# Patient Record
Sex: MALE | Race: WHITE | NOT HISPANIC OR LATINO | URBAN - METROPOLITAN AREA
[De-identification: names, ages, dates, MRNs, and addresses within clinical notes are randomized per-mention and may not be internally consistent; named-entity substitution may affect disease eponyms.]

---

## 2018-08-31 ENCOUNTER — APPOINTMENT (OUTPATIENT)
Dept: GENERAL RADIOLOGY | Facility: HOSPITAL | Age: 48
End: 2018-08-31

## 2018-08-31 ENCOUNTER — HOSPITAL ENCOUNTER (OUTPATIENT)
Facility: HOSPITAL | Age: 48
Discharge: HOME | End: 2018-09-01
Attending: EMERGENCY MEDICINE | Admitting: EMERGENCY MEDICINE

## 2018-08-31 DIAGNOSIS — R26.2 DIFFICULTY WALKING: ICD-10-CM

## 2018-08-31 DIAGNOSIS — S82.51XA DISPLACED FRACTURE OF MEDIAL MALLEOLUS OF RIGHT TIBIA, INITIAL ENCOUNTER FOR CLOSED FRACTURE: Primary | ICD-10-CM

## 2018-08-31 DIAGNOSIS — S82.831A CLOSED FRACTURE OF PROXIMAL END OF RIGHT FIBULA, UNSPECIFIED FRACTURE MORPHOLOGY, INITIAL ENCOUNTER: ICD-10-CM

## 2018-08-31 PROBLEM — S82.861A: Status: ACTIVE | Noted: 2018-08-31

## 2018-08-31 PROBLEM — S93.422A SPRAIN OF DELTOID LIGAMENT OF LEFT ANKLE: Status: ACTIVE | Noted: 2018-08-31

## 2018-08-31 PROBLEM — S93.431A SYNDESMOTIC DISRUPTION OF RIGHT ANKLE: Status: ACTIVE | Noted: 2018-08-31

## 2018-08-31 PROBLEM — S82.62XD: Status: ACTIVE | Noted: 2018-08-31

## 2018-08-31 PROCEDURE — 25010000002 MORPHINE PER 10 MG: Performed by: EMERGENCY MEDICINE

## 2018-08-31 PROCEDURE — 73600 X-RAY EXAM OF ANKLE: CPT

## 2018-08-31 PROCEDURE — G0378 HOSPITAL OBSERVATION PER HR: HCPCS

## 2018-08-31 PROCEDURE — 99284 EMERGENCY DEPT VISIT MOD MDM: CPT

## 2018-08-31 PROCEDURE — 25010000002 ONDANSETRON PER 1 MG: Performed by: EMERGENCY MEDICINE

## 2018-08-31 PROCEDURE — 96374 THER/PROPH/DIAG INJ IV PUSH: CPT

## 2018-08-31 PROCEDURE — 96375 TX/PRO/DX INJ NEW DRUG ADDON: CPT

## 2018-08-31 PROCEDURE — 73590 X-RAY EXAM OF LOWER LEG: CPT

## 2018-08-31 RX ORDER — NALOXONE HCL 0.4 MG/ML
0.4 VIAL (ML) INJECTION
Status: DISCONTINUED | OUTPATIENT
Start: 2018-08-31 | End: 2018-09-01 | Stop reason: HOSPADM

## 2018-08-31 RX ORDER — ACETAMINOPHEN 500 MG
500 TABLET ORAL EVERY 6 HOURS PRN
COMMUNITY
End: 2018-09-01 | Stop reason: HOSPADM

## 2018-08-31 RX ORDER — ONDANSETRON 2 MG/ML
4 INJECTION INTRAMUSCULAR; INTRAVENOUS ONCE
Status: COMPLETED | OUTPATIENT
Start: 2018-08-31 | End: 2018-08-31

## 2018-08-31 RX ORDER — CEFAZOLIN SODIUM 2 G/100ML
2 INJECTION, SOLUTION INTRAVENOUS ONCE
Status: COMPLETED | OUTPATIENT
Start: 2018-08-31 | End: 2018-09-01

## 2018-08-31 RX ORDER — HYDROCODONE BITARTRATE AND ACETAMINOPHEN 7.5; 325 MG/1; MG/1
1 TABLET ORAL EVERY 4 HOURS PRN
Status: DISCONTINUED | OUTPATIENT
Start: 2018-08-31 | End: 2018-09-01 | Stop reason: HOSPADM

## 2018-08-31 RX ORDER — SODIUM CHLORIDE 0.9 % (FLUSH) 0.9 %
1-10 SYRINGE (ML) INJECTION AS NEEDED
Status: DISCONTINUED | OUTPATIENT
Start: 2018-08-31 | End: 2018-09-01 | Stop reason: HOSPADM

## 2018-08-31 RX ADMIN — HYDROCODONE BITARTRATE AND ACETAMINOPHEN 1 TABLET: 7.5; 325 TABLET ORAL at 15:23

## 2018-08-31 RX ADMIN — HYDROCODONE BITARTRATE AND ACETAMINOPHEN 1 TABLET: 7.5; 325 TABLET ORAL at 19:35

## 2018-08-31 RX ADMIN — MORPHINE SULFATE 4 MG: 4 INJECTION INTRAVENOUS at 10:49

## 2018-08-31 RX ADMIN — ONDANSETRON 4 MG: 2 INJECTION INTRAMUSCULAR; INTRAVENOUS at 10:49

## 2018-09-01 ENCOUNTER — ANESTHESIA (OUTPATIENT)
Dept: PERIOP | Facility: HOSPITAL | Age: 48
End: 2018-09-01

## 2018-09-01 ENCOUNTER — APPOINTMENT (OUTPATIENT)
Dept: GENERAL RADIOLOGY | Facility: HOSPITAL | Age: 48
End: 2018-09-01

## 2018-09-01 ENCOUNTER — ANESTHESIA EVENT (OUTPATIENT)
Dept: PERIOP | Facility: HOSPITAL | Age: 48
End: 2018-09-01

## 2018-09-01 VITALS
BODY MASS INDEX: 28.31 KG/M2 | TEMPERATURE: 97 F | OXYGEN SATURATION: 97 % | WEIGHT: 202.2 LBS | DIASTOLIC BLOOD PRESSURE: 88 MMHG | RESPIRATION RATE: 16 BRPM | SYSTOLIC BLOOD PRESSURE: 132 MMHG | HEART RATE: 78 BPM | HEIGHT: 71 IN

## 2018-09-01 LAB
ANION GAP SERPL CALCULATED.3IONS-SCNC: 8.7 MMOL/L
BASOPHILS # BLD AUTO: 0.03 10*3/MM3 (ref 0–0.2)
BASOPHILS NFR BLD AUTO: 0.4 % (ref 0–1.5)
BUN BLD-MCNC: 11 MG/DL (ref 6–20)
BUN/CREAT SERPL: 10.7 (ref 7–25)
CALCIUM SPEC-SCNC: 8.9 MG/DL (ref 8.6–10.5)
CHLORIDE SERPL-SCNC: 104 MMOL/L (ref 98–107)
CO2 SERPL-SCNC: 27.3 MMOL/L (ref 22–29)
CREAT BLD-MCNC: 1.03 MG/DL (ref 0.76–1.27)
DEPRECATED RDW RBC AUTO: 41 FL (ref 37–54)
EOSINOPHIL # BLD AUTO: 0.19 10*3/MM3 (ref 0–0.7)
EOSINOPHIL NFR BLD AUTO: 2.2 % (ref 0.3–6.2)
ERYTHROCYTE [DISTWIDTH] IN BLOOD BY AUTOMATED COUNT: 12.7 % (ref 11.5–14.5)
GFR SERPL CREATININE-BSD FRML MDRD: 77 ML/MIN/1.73
GLUCOSE BLD-MCNC: 106 MG/DL (ref 65–99)
HCT VFR BLD AUTO: 46.8 % (ref 40.4–52.2)
HGB BLD-MCNC: 15 G/DL (ref 13.7–17.6)
IMM GRANULOCYTES # BLD: 0.02 10*3/MM3 (ref 0–0.03)
IMM GRANULOCYTES NFR BLD: 0.2 % (ref 0–0.5)
LYMPHOCYTES # BLD AUTO: 2.17 10*3/MM3 (ref 0.9–4.8)
LYMPHOCYTES NFR BLD AUTO: 25.4 % (ref 19.6–45.3)
MCH RBC QN AUTO: 28.7 PG (ref 27–32.7)
MCHC RBC AUTO-ENTMCNC: 32.1 G/DL (ref 32.6–36.4)
MCV RBC AUTO: 89.7 FL (ref 79.8–96.2)
MONOCYTES # BLD AUTO: 0.7 10*3/MM3 (ref 0.2–1.2)
MONOCYTES NFR BLD AUTO: 8.2 % (ref 5–12)
NEUTROPHILS # BLD AUTO: 5.42 10*3/MM3 (ref 1.9–8.1)
NEUTROPHILS NFR BLD AUTO: 63.6 % (ref 42.7–76)
PLATELET # BLD AUTO: 251 10*3/MM3 (ref 140–500)
PMV BLD AUTO: 10.3 FL (ref 6–12)
POTASSIUM BLD-SCNC: 5 MMOL/L (ref 3.5–5.2)
RBC # BLD AUTO: 5.22 10*6/MM3 (ref 4.6–6)
SODIUM BLD-SCNC: 140 MMOL/L (ref 136–145)
WBC NRBC COR # BLD: 8.53 10*3/MM3 (ref 4.5–10.7)

## 2018-09-01 PROCEDURE — 25010000003 CEFAZOLIN IN DEXTROSE 2-4 GM/100ML-% SOLUTION: Performed by: ORTHOPAEDIC SURGERY

## 2018-09-01 PROCEDURE — 80048 BASIC METABOLIC PNL TOTAL CA: CPT | Performed by: ORTHOPAEDIC SURGERY

## 2018-09-01 PROCEDURE — C1769 GUIDE WIRE: HCPCS | Performed by: ORTHOPAEDIC SURGERY

## 2018-09-01 PROCEDURE — 25010000002 PROPOFOL 10 MG/ML EMULSION: Performed by: NURSE ANESTHETIST, CERTIFIED REGISTERED

## 2018-09-01 PROCEDURE — 85025 COMPLETE CBC W/AUTO DIFF WBC: CPT | Performed by: ORTHOPAEDIC SURGERY

## 2018-09-01 PROCEDURE — 25010000002 FENTANYL CITRATE (PF) 100 MCG/2ML SOLUTION: Performed by: NURSE ANESTHETIST, CERTIFIED REGISTERED

## 2018-09-01 PROCEDURE — 25010000002 ROPIVACAINE PER 1 MG: Performed by: ANESTHESIOLOGY

## 2018-09-01 PROCEDURE — 25010000002 FENTANYL CITRATE (PF) 100 MCG/2ML SOLUTION: Performed by: ANESTHESIOLOGY

## 2018-09-01 PROCEDURE — 96376 TX/PRO/DX INJ SAME DRUG ADON: CPT

## 2018-09-01 PROCEDURE — 25010000002 ONDANSETRON PER 1 MG: Performed by: NURSE ANESTHETIST, CERTIFIED REGISTERED

## 2018-09-01 PROCEDURE — 25010000002 HYDROMORPHONE PER 4 MG: Performed by: ORTHOPAEDIC SURGERY

## 2018-09-01 PROCEDURE — 76000 FLUOROSCOPY <1 HR PHYS/QHP: CPT

## 2018-09-01 PROCEDURE — C1713 ANCHOR/SCREW BN/BN,TIS/BN: HCPCS | Performed by: ORTHOPAEDIC SURGERY

## 2018-09-01 PROCEDURE — 25010000002 KETOROLAC TROMETHAMINE PER 15 MG: Performed by: NURSE ANESTHETIST, CERTIFIED REGISTERED

## 2018-09-01 PROCEDURE — 25010000002 DEXAMETHASONE PER 1 MG: Performed by: NURSE ANESTHETIST, CERTIFIED REGISTERED

## 2018-09-01 PROCEDURE — 73610 X-RAY EXAM OF ANKLE: CPT

## 2018-09-01 PROCEDURE — 97161 PT EVAL LOW COMPLEX 20 MIN: CPT | Performed by: PHYSICAL THERAPIST

## 2018-09-01 PROCEDURE — G0378 HOSPITAL OBSERVATION PER HR: HCPCS

## 2018-09-01 PROCEDURE — 25010000002 MIDAZOLAM PER 1 MG: Performed by: ANESTHESIOLOGY

## 2018-09-01 DEVICE — BONE SCREW
Type: IMPLANTABLE DEVICE | Site: ANKLE | Status: FUNCTIONAL
Brand: VARIAX

## 2018-09-01 DEVICE — CANNULATED SCREW
Type: IMPLANTABLE DEVICE | Site: ANKLE | Status: FUNCTIONAL
Brand: ASNIS

## 2018-09-01 RX ORDER — OXYCODONE AND ACETAMINOPHEN 7.5; 325 MG/1; MG/1
1 TABLET ORAL ONCE AS NEEDED
Status: DISCONTINUED | OUTPATIENT
Start: 2018-09-01 | End: 2018-09-01 | Stop reason: HOSPADM

## 2018-09-01 RX ORDER — PROMETHAZINE HYDROCHLORIDE 25 MG/ML
12.5 INJECTION, SOLUTION INTRAMUSCULAR; INTRAVENOUS ONCE AS NEEDED
Status: DISCONTINUED | OUTPATIENT
Start: 2018-09-01 | End: 2018-09-01 | Stop reason: HOSPADM

## 2018-09-01 RX ORDER — EPHEDRINE SULFATE 50 MG/ML
5 INJECTION, SOLUTION INTRAVENOUS ONCE AS NEEDED
Status: DISCONTINUED | OUTPATIENT
Start: 2018-09-01 | End: 2018-09-01 | Stop reason: HOSPADM

## 2018-09-01 RX ORDER — PROMETHAZINE HYDROCHLORIDE 25 MG/1
12.5 TABLET ORAL ONCE AS NEEDED
Status: DISCONTINUED | OUTPATIENT
Start: 2018-09-01 | End: 2018-09-01 | Stop reason: HOSPADM

## 2018-09-01 RX ORDER — PROMETHAZINE HYDROCHLORIDE 25 MG/1
25 SUPPOSITORY RECTAL ONCE AS NEEDED
Status: DISCONTINUED | OUTPATIENT
Start: 2018-09-01 | End: 2018-09-01 | Stop reason: HOSPADM

## 2018-09-01 RX ORDER — FAMOTIDINE 10 MG/ML
20 INJECTION, SOLUTION INTRAVENOUS ONCE
Status: COMPLETED | OUTPATIENT
Start: 2018-09-01 | End: 2018-09-01

## 2018-09-01 RX ORDER — ONDANSETRON 2 MG/ML
4 INJECTION INTRAMUSCULAR; INTRAVENOUS ONCE AS NEEDED
Status: DISCONTINUED | OUTPATIENT
Start: 2018-09-01 | End: 2018-09-01 | Stop reason: HOSPADM

## 2018-09-01 RX ORDER — DEXAMETHASONE SODIUM PHOSPHATE 10 MG/ML
INJECTION INTRAMUSCULAR; INTRAVENOUS AS NEEDED
Status: DISCONTINUED | OUTPATIENT
Start: 2018-09-01 | End: 2018-09-01 | Stop reason: SURG

## 2018-09-01 RX ORDER — NAPROXEN 500 MG/1
500 TABLET ORAL 2 TIMES DAILY
Qty: 60 TABLET | Refills: 0 | Status: SHIPPED | OUTPATIENT
Start: 2018-09-01

## 2018-09-01 RX ORDER — SODIUM CHLORIDE, SODIUM LACTATE, POTASSIUM CHLORIDE, CALCIUM CHLORIDE 600; 310; 30; 20 MG/100ML; MG/100ML; MG/100ML; MG/100ML
9 INJECTION, SOLUTION INTRAVENOUS CONTINUOUS
Status: DISCONTINUED | OUTPATIENT
Start: 2018-09-01 | End: 2018-09-01 | Stop reason: HOSPADM

## 2018-09-01 RX ORDER — FENTANYL CITRATE 50 UG/ML
50 INJECTION, SOLUTION INTRAMUSCULAR; INTRAVENOUS
Status: DISCONTINUED | OUTPATIENT
Start: 2018-09-01 | End: 2018-09-01 | Stop reason: HOSPADM

## 2018-09-01 RX ORDER — FENTANYL CITRATE 50 UG/ML
INJECTION, SOLUTION INTRAMUSCULAR; INTRAVENOUS
Status: COMPLETED
Start: 2018-09-01 | End: 2018-09-01

## 2018-09-01 RX ORDER — CEFAZOLIN SODIUM 2 G/100ML
2 INJECTION, SOLUTION INTRAVENOUS EVERY 8 HOURS
Status: COMPLETED | OUTPATIENT
Start: 2018-09-01 | End: 2018-09-01

## 2018-09-01 RX ORDER — DIPHENHYDRAMINE HYDROCHLORIDE 50 MG/ML
12.5 INJECTION INTRAMUSCULAR; INTRAVENOUS
Status: DISCONTINUED | OUTPATIENT
Start: 2018-09-01 | End: 2018-09-01 | Stop reason: HOSPADM

## 2018-09-01 RX ORDER — MAGNESIUM HYDROXIDE 1200 MG/15ML
LIQUID ORAL AS NEEDED
Status: DISCONTINUED | OUTPATIENT
Start: 2018-09-01 | End: 2018-09-01 | Stop reason: HOSPADM

## 2018-09-01 RX ORDER — FLUMAZENIL 0.1 MG/ML
0.2 INJECTION INTRAVENOUS AS NEEDED
Status: DISCONTINUED | OUTPATIENT
Start: 2018-09-01 | End: 2018-09-01 | Stop reason: HOSPADM

## 2018-09-01 RX ORDER — HYDROCODONE BITARTRATE AND ACETAMINOPHEN 7.5; 325 MG/1; MG/1
1 TABLET ORAL EVERY 4 HOURS PRN
Qty: 60 TABLET | Refills: 0 | Status: SHIPPED | OUTPATIENT
Start: 2018-09-01 | End: 2018-09-01

## 2018-09-01 RX ORDER — SODIUM CHLORIDE 0.9 % (FLUSH) 0.9 %
1-10 SYRINGE (ML) INJECTION AS NEEDED
Status: DISCONTINUED | OUTPATIENT
Start: 2018-09-01 | End: 2018-09-01 | Stop reason: HOSPADM

## 2018-09-01 RX ORDER — PROPOFOL 10 MG/ML
VIAL (ML) INTRAVENOUS AS NEEDED
Status: DISCONTINUED | OUTPATIENT
Start: 2018-09-01 | End: 2018-09-01 | Stop reason: SURG

## 2018-09-01 RX ORDER — ONDANSETRON 2 MG/ML
INJECTION INTRAMUSCULAR; INTRAVENOUS AS NEEDED
Status: DISCONTINUED | OUTPATIENT
Start: 2018-09-01 | End: 2018-09-01 | Stop reason: SURG

## 2018-09-01 RX ORDER — LIDOCAINE HYDROCHLORIDE 10 MG/ML
0.5 INJECTION, SOLUTION EPIDURAL; INFILTRATION; INTRACAUDAL; PERINEURAL ONCE AS NEEDED
Status: DISCONTINUED | OUTPATIENT
Start: 2018-09-01 | End: 2018-09-01 | Stop reason: HOSPADM

## 2018-09-01 RX ORDER — ROPIVACAINE HYDROCHLORIDE 5 MG/ML
INJECTION, SOLUTION EPIDURAL; INFILTRATION; PERINEURAL AS NEEDED
Status: DISCONTINUED | OUTPATIENT
Start: 2018-09-01 | End: 2018-09-01 | Stop reason: SURG

## 2018-09-01 RX ORDER — LABETALOL HYDROCHLORIDE 5 MG/ML
5 INJECTION, SOLUTION INTRAVENOUS
Status: DISCONTINUED | OUTPATIENT
Start: 2018-09-01 | End: 2018-09-01 | Stop reason: HOSPADM

## 2018-09-01 RX ORDER — PROMETHAZINE HYDROCHLORIDE 25 MG/1
25 TABLET ORAL ONCE AS NEEDED
Status: DISCONTINUED | OUTPATIENT
Start: 2018-09-01 | End: 2018-09-01 | Stop reason: HOSPADM

## 2018-09-01 RX ORDER — HYDROCODONE BITARTRATE AND ACETAMINOPHEN 7.5; 325 MG/1; MG/1
1 TABLET ORAL ONCE AS NEEDED
Status: DISCONTINUED | OUTPATIENT
Start: 2018-09-01 | End: 2018-09-01 | Stop reason: HOSPADM

## 2018-09-01 RX ORDER — MIDAZOLAM HYDROCHLORIDE 1 MG/ML
1 INJECTION INTRAMUSCULAR; INTRAVENOUS
Status: DISCONTINUED | OUTPATIENT
Start: 2018-09-01 | End: 2018-09-01 | Stop reason: HOSPADM

## 2018-09-01 RX ORDER — KETOROLAC TROMETHAMINE 30 MG/ML
INJECTION, SOLUTION INTRAMUSCULAR; INTRAVENOUS AS NEEDED
Status: DISCONTINUED | OUTPATIENT
Start: 2018-09-01 | End: 2018-09-01 | Stop reason: SURG

## 2018-09-01 RX ORDER — MIDAZOLAM HYDROCHLORIDE 1 MG/ML
2 INJECTION INTRAMUSCULAR; INTRAVENOUS
Status: DISCONTINUED | OUTPATIENT
Start: 2018-09-01 | End: 2018-09-01 | Stop reason: HOSPADM

## 2018-09-01 RX ORDER — HYDROCODONE BITARTRATE AND ACETAMINOPHEN 7.5; 325 MG/1; MG/1
1 TABLET ORAL EVERY 4 HOURS PRN
Qty: 60 TABLET | Refills: 0 | Status: SHIPPED | OUTPATIENT
Start: 2018-09-01 | End: 2018-09-13

## 2018-09-01 RX ORDER — LIDOCAINE HYDROCHLORIDE 20 MG/ML
INJECTION, SOLUTION INFILTRATION; PERINEURAL AS NEEDED
Status: DISCONTINUED | OUTPATIENT
Start: 2018-09-01 | End: 2018-09-01 | Stop reason: SURG

## 2018-09-01 RX ADMIN — CEFAZOLIN SODIUM 2 G: 2 INJECTION, SOLUTION INTRAVENOUS at 16:32

## 2018-09-01 RX ADMIN — MIDAZOLAM HYDROCHLORIDE 2 MG: 2 INJECTION, SOLUTION INTRAMUSCULAR; INTRAVENOUS at 07:10

## 2018-09-01 RX ADMIN — FAMOTIDINE 20 MG: 10 INJECTION, SOLUTION INTRAVENOUS at 07:12

## 2018-09-01 RX ADMIN — PROPOFOL 200 MG: 10 INJECTION, EMULSION INTRAVENOUS at 07:58

## 2018-09-01 RX ADMIN — ONDANSETRON 4 MG: 2 INJECTION INTRAMUSCULAR; INTRAVENOUS at 08:55

## 2018-09-01 RX ADMIN — FENTANYL CITRATE 25 MCG: 50 INJECTION INTRAMUSCULAR; INTRAVENOUS at 08:30

## 2018-09-01 RX ADMIN — FENTANYL CITRATE 50 MCG: 50 INJECTION INTRAMUSCULAR; INTRAVENOUS at 10:45

## 2018-09-01 RX ADMIN — HYDROMORPHONE HYDROCHLORIDE 0.5 MG: 1 INJECTION, SOLUTION INTRAMUSCULAR; INTRAVENOUS; SUBCUTANEOUS at 05:36

## 2018-09-01 RX ADMIN — KETOROLAC TROMETHAMINE 30 MG: 30 INJECTION, SOLUTION INTRAMUSCULAR; INTRAVENOUS at 08:55

## 2018-09-01 RX ADMIN — LIDOCAINE HYDROCHLORIDE 60 MG: 20 INJECTION, SOLUTION INFILTRATION; PERINEURAL at 07:58

## 2018-09-01 RX ADMIN — FENTANYL CITRATE 75 MCG: 50 INJECTION INTRAMUSCULAR; INTRAVENOUS at 10:05

## 2018-09-01 RX ADMIN — HYDROCODONE BITARTRATE AND ACETAMINOPHEN 1 TABLET: 7.5; 325 TABLET ORAL at 17:27

## 2018-09-01 RX ADMIN — HYDROMORPHONE HYDROCHLORIDE 0.5 MG: 1 INJECTION, SOLUTION INTRAMUSCULAR; INTRAVENOUS; SUBCUTANEOUS at 02:50

## 2018-09-01 RX ADMIN — SODIUM CHLORIDE, POTASSIUM CHLORIDE, SODIUM LACTATE AND CALCIUM CHLORIDE 9 ML/HR: 600; 310; 30; 20 INJECTION, SOLUTION INTRAVENOUS at 07:10

## 2018-09-01 RX ADMIN — ROPIVACAINE HYDROCHLORIDE 30 ML: 5 INJECTION, SOLUTION EPIDURAL; INFILTRATION; PERINEURAL at 07:26

## 2018-09-01 RX ADMIN — CEFAZOLIN SODIUM 2 G: 2 INJECTION, SOLUTION INTRAVENOUS at 08:04

## 2018-09-01 RX ADMIN — HYDROCODONE BITARTRATE AND ACETAMINOPHEN 1 TABLET: 7.5; 325 TABLET ORAL at 13:08

## 2018-09-01 RX ADMIN — HYDROMORPHONE HYDROCHLORIDE 0.5 MG: 1 INJECTION, SOLUTION INTRAMUSCULAR; INTRAVENOUS; SUBCUTANEOUS at 00:53

## 2018-09-01 RX ADMIN — FENTANYL CITRATE 50 MCG: 50 INJECTION INTRAMUSCULAR; INTRAVENOUS at 10:35

## 2018-09-01 RX ADMIN — FENTANYL CITRATE 100 MCG: 50 INJECTION INTRAMUSCULAR; INTRAVENOUS at 07:10

## 2018-09-01 RX ADMIN — DEXAMETHASONE SODIUM PHOSPHATE 8 MG: 10 INJECTION INTRAMUSCULAR; INTRAVENOUS at 08:55

## 2018-09-01 NOTE — OP NOTE
Operative  Note    Patient: Thom Ramirez    YOB: 1970    Medical Record Number: [id number]    Attending Physician: Celestina Meng,*    Primary Care Physician: Provider, No Known    Surgeon: Celestina Meng MD    Date of Service: 9/1/2018     Pre-op Diagnosis:   1. Right  ankle medial malleolar fracture 2. Right ankle high fibular fracture, Maisonnauve injury  3. Syndesmotic disruption right ankle  4. Left ankle lateral malleolus tip avulsion fracture, Sprain left ankle    Post-Op Diagnosis Codes:  1. Right  ankle bimalleolar fracture 2. Right ankle high fibular fracture, Maisonnauve injury  3. Syndesmotic disruption right ankle  4. Left ankle lateral malleolus tip avulsion fracture, Sprain left ankle    PROCEDURE PERFORMED:     1. ORIF RIGHT ANKLE MEDIAL MALLEOLAR FRACTURE   2. OPEN REDUCTION AND INTERNAL FIXATION OF RIGHT ANKLE SYNDESMOSIS  3. EXAMINATION LEFT ANKLE UNDER ANESTHESIA  4.NONOPERATIVE MANAGEMENT LEFT ANKLE FIBULA AVULSION FRACTURE    RIGHT  ANKLE OPEN REDUCTION INTERNAL FIXATION RT ANKLE FRACTURE   ankle  fracture  utilizing a  Cortical screws and 4.0 cannulated cancellous screws ([place]).     IMPLANTS  3.5 mm Cortical screw - 2  4.0 mm Cannulated cancellous screw - 2    ANESTHESIA: General  Anesthesiologist: Debo Hills MD  CRNA: Salud Patterson CRNA       Estimated Blood Loss: minimal    Specimens: * No orders in the log *     COMPLICATIONS: Nil.     DRAINS:     SURGEON: Celestina Meng M.D.    ASSISTANTS:  None.        INDICATIONS: The patient is a 47 y.o. male  who presented to [facility] following a history of fall from his roof  with a history of injury and ankle fracture.  The patient sustained bilateral ankle injuries.  He was evaluated in the emergency room and placed into a splint for the RIGHT ankle.  He was given a boot for the LEFT ankle. The patient was evaluated and scheduled for surgery . Options were discussed. The  patient was indicated for an open reduction and internal fixation of RIGHT ankle fracture and examination under anesthesia of the LEFT ankle.  The patient elected to proceed with an open reduction internal fixation. Likely risks and benefits of the procedure including, but not limited to infection, malunion, nonunion, posttraumatic stiffness, posttraumatic arthritis, possibility of injury to nerves, vessels or tendons have been discussed in detail. Despite the risks involved, the patient elected to proceed and informed consent was obtained and was scheduled for surgery. The patient was seen in the preoperative holding area and the operative site was marked.    PROCEDURE: The patient has been transferred to [facility] operating room. Preoperative antibiotic Kefzol  Intravenously  were given prior to the placement of tourniquet . After achieving adequate anesthesia, a well-padded tourniquet was placed over the proximal aspect of the operative thigh. The leg was prepped and draped in the usual sterile fashion. Surgical timeout was done. Correct patient surgical side and site were identified. Tourniquet was elevated to a pressure of 250 mmHg.     A skin incision was made on the medial malleolus centering over the fracture site. The skin and subcutaneous tissue were incised and the fracture site was identified. There was hematoma this was cleared.  The medial malleolus fracture fragment was inspected.  This was a small fragment and was obliquely oriented. The medial malleolus fracture was reduced and temporarily fixed with two threaded K wires. I had to place the K wires, aiming towards the posterior cortex to get the best fixation. The reduction of the medial malleolus was found to be satisfactory. The position of K wires for the 4.0 cancellous screws was found to be satisfactory. The ankle mortise was found to be congruous. The cannulated reamer was utilized and partially-threaded 4.0 cannulated  cancellous screws were seated over the guidewires.  The reduction of the fracture,  Fixation of the fracture and position of the hardware was found to be satisfactory and stable.      The posterior malleolus fragment was very small and satisfactorily reduced in view of this, no internal fixation is being used for the posterior malleolar fragment.    The ankle mortise was stressed with external rotation there was   widening of the syndesmosis. The patient had a significant syndesmotic injury.  There was a small fracture fragment of the tip of the lateral malleolus. This fragment was small and was not displaced.  It was intra-articular.  The major factor in the fibula was the fibular neck fracture.  This was a Maisonnauve injury.  It was planned to proceed with open reduction of the syndesmosis and internal fixation. His small skin incision was made over the lateral aspect of the lateral malleolus about 2 cm.  Skin and subcutaneous tissue were incised and the lateral fibular margin was identified.  Soft tissue dissection was done anteriorly to palpate the syndesmosis.  This was found to be widened.  This was reduced by utilizing a pointed reduction forceps/ Lees clamp. The reduction was found to be satisfactory with restoration of the fibular length and satisfactory overlap of the fibula syndesmotic reduction.  3.5 mm cortical screws were placed as positional screws from lateral cortex of the fibula transversely parallel to the articular surface about 2 cm proximal to the tibial articular surface. The reduction of the syndesmosis and the position of the hardware was found to be satisfactory.  After removing the clamp.  The ankle was stressed and it was found to be stable.    The sponge count and needle count was found to be correct. The incisions were thoroughly irrigated and were closed in layers.  Sterile dressings were placed and the patient was transferred to the recovery room in a stable condition with a  well-padded posterior splint.      The LEFT ankle was removed from the boot and examined.  The ankle mortise was stable for stress views.  The suspicion of medial clear wide joint space was not there with proper x-rays were obtained. We will continue the cam boot for the LEFT ankle. The patient will be allowed weightbearing as tolerated on the LEFT ankle.    The patient tolerated the procedure well.  There were no complications.  The patient  is being transferred to the floor and then discharged home with instructions to keep the operated lower extremity elevated above heart level.  The patient  will remain nonweightbearing on the operated RIGHT lower extremity.      The patient will follow up with me in the office in 10-12 days  and will call [phone] for appointment.      Norco 7.5/325 mg 1 by mouth every 4 hours when necessary pain, prescription has been given.    The patient will notify me immediately if they notice any temperature greater than 101°F or increased pain or any drainage from her incision in the splint.    I discussed the satisfactory performance of the procedure with the patient's family and discussed with them The postoperative management.     9/1/2018  10:06 AM  Celestina Meng MD    CC: Provider, No Known; MD Yusra Ayala, Celestina MARVIN,*

## 2018-09-01 NOTE — PLAN OF CARE
Problem: Patient Care Overview  Goal: Plan of Care Review  Outcome: Ongoing (interventions implemented as appropriate)   09/01/18 8082   Coping/Psychosocial   Plan of Care Reviewed With patient   Plan of Care Review   Progress improving   OTHER   Outcome Summary Patient suffered fall and B ankle fractures, is NWB on R LE and left is WBAT in walking boot. Patient able to ambulate short distances with RW and maintain NWB status and is independent with mobility. D/C PT, no concerns for patient to return home

## 2018-09-01 NOTE — ANESTHESIA PROCEDURE NOTES
Adductor canal block    Patient location during procedure: holding area  Start time: 9/1/2018 7:24 AM  Stop time: 9/1/2018 7:26 AM  Reason for block: at surgeon's request and post-op pain management  Performed by  Anesthesiologist: NILO CUNNINGHAM  Preanesthetic Checklist  Completed: patient identified, site marked, surgical consent, pre-op evaluation, timeout performed, IV checked, risks and benefits discussed and monitors and equipment checked  Prep:  Sterile barriers:cap, gloves and mask  Prep: ChloraPrep  Patient monitoring: blood pressure monitoring, continuous pulse oximetry and EKG  Procedure  Sedation:yes  Performed under: local infiltration  Guidance:ultrasound guided  ULTRASOUND INTERPRETATION. Using ultrasound guidance a 21 G gauge needle was placed in close proximity to the nerve, at which point, under ultrasound guidance anesthetic was injected in the area of the nerve and spread of the anesthesia was seen on ultrasound in close proximity thereto.  There were no abnormalities seen on ultrasound; a digital image was taken; and the patient tolerated the procedure with no complications. Images:still images obtained    Laterality:right  Block Type:adductor canal block  Injection Technique:single-shot  Needle Type:echogenic  Needle Gauge:21 G    Medications  Local Injected:ropivacaine 0.5% Local Amount Injected:10mL  Post Assessment  Injection Assessment: negative aspiration for heme, no paresthesia on injection and incremental injection  Patient Tolerance:comfortable throughout block  Complications:no

## 2018-09-01 NOTE — PLAN OF CARE
Problem: Patient Care Overview  Goal: Plan of Care Review  Outcome: Ongoing (interventions implemented as appropriate)   09/01/18 1501   Coping/Psychosocial   Plan of Care Reviewed With patient   Plan of Care Review   Progress improving   OTHER   Outcome Summary Bilateral ankle fx. ORIF of right done in or today. Left in walking boot. Blocks working from surgery. UP with PT. Expecting discharge tonight or am       Problem: Fall Risk (Adult)  Goal: Identify Related Risk Factors and Signs and Symptoms  Outcome: Outcome(s) achieved Date Met: 09/01/18    Goal: Absence of Fall  Outcome: Ongoing (interventions implemented as appropriate)

## 2018-09-01 NOTE — PLAN OF CARE
Problem: Patient Care Overview  Goal: Plan of Care Review  Outcome: Ongoing (interventions implemented as appropriate)   09/01/18 0300   Coping/Psychosocial   Plan of Care Reviewed With patient   Plan of Care Review   Progress improving   OTHER   Outcome Summary Pt pain controlled with PO and IV pain meds. VSS. Pt NWB to RLE and WBAT to LLE. Pt voiding per urinal. Splint on RLE and boot on LLE. Pt to go to surgery for ORIF of RLE. NPO. Will continue to monitor.      Goal: Individualization and Mutuality  Outcome: Ongoing (interventions implemented as appropriate)    Goal: Discharge Needs Assessment  Outcome: Ongoing (interventions implemented as appropriate)    Goal: Interprofessional Rounds/Family Conf  Outcome: Ongoing (interventions implemented as appropriate)      Problem: Fall Risk (Adult)  Goal: Identify Related Risk Factors and Signs and Symptoms  Outcome: Ongoing (interventions implemented as appropriate)    Goal: Absence of Fall  Outcome: Ongoing (interventions implemented as appropriate)      Problem: Pain, Acute (Adult)  Goal: Identify Related Risk Factors and Signs and Symptoms  Outcome: Ongoing (interventions implemented as appropriate)    Goal: Acceptable Pain Control/Comfort Level  Outcome: Ongoing (interventions implemented as appropriate)

## 2018-09-01 NOTE — ANESTHESIA PROCEDURE NOTES
Popliteal block    Start time: 9/1/2018 7:18 AM  Stop time: 9/1/2018 7:24 AM  Reason for block: at surgeon's request and post-op pain management  Performed by  Anesthesiologist: NILO CUNNINGHAM  Preanesthetic Checklist  Completed: patient identified, site marked, surgical consent, pre-op evaluation, timeout performed, IV checked, risks and benefits discussed and monitors and equipment checked  Prep:  Pt Position: supine  Sterile barriers:cap, gloves and mask  Prep: ChloraPrep  Patient monitoring: blood pressure monitoring, continuous pulse oximetry and EKG  Procedure  Sedation:yes  Performed under: local infiltration  Guidance:ultrasound guided  ULTRASOUND INTERPRETATION.  Using ultrasound guidance a 21 G gauge needle was placed in close proximity to the sciatic nerve, at which point, under ultrasound guidance anesthetic was injected in the area of the nerve and spread of the anesthesia was seen on ultrasound in close proximity thereto.  There were no abnormalities seen on ultrasound; a digital image was taken; and the patient tolerated the procedure with no complications. Images:still images obtained    Laterality:right  Block Type:popliteal  Injection Technique:single-shot  Needle Type:echogenic  Needle Gauge:21 G    Medications  Local Injected:ropivacaine 0.5% Local Amount Injected:20mL  Post Assessment  Injection Assessment: negative aspiration for heme, no paresthesia on injection and incremental injection  Patient Tolerance:comfortable throughout block  Complications:no

## 2018-09-01 NOTE — ANESTHESIA POSTPROCEDURE EVALUATION
Patient: Thom Ramirez    Procedure Summary     Date:  09/01/18 Room / Location:  [facility] OR [address] [place] [address] MAIN OR    Anesthesia Start:  0745 Anesthesia Stop:  1005    Procedure:  ORIF RIGHT ANKLE, EXAMINATION LEFT ANKLE (Right Ankle) Diagnosis:      Surgeon:  Celestina Meng MD Provider:  Debo Hills MD    Anesthesia Type:  general ASA Status:  1          Anesthesia Type: general  Last vitals  BP   124/94 (09/01/18 1015)   Temp   36.5 °C (97.7 °F) (09/01/18 1000)   Pulse   72 (09/01/18 1015)   Resp   16 (09/01/18 1015)     SpO2   96 % (09/01/18 1015)     Post Anesthesia Care and Evaluation    Patient location during evaluation: bedside  Patient participation: complete - patient participated  Level of consciousness: awake and alert  Pain management: adequate  Airway patency: patent  Anesthetic complications: No anesthetic complications  PONV Status: controlled  Cardiovascular status: acceptable  Respiratory status: acceptable  Hydration status: acceptable

## 2018-09-01 NOTE — BRIEF OP NOTE
ANKLE OPEN REDUCTION INTERNAL FIXATION  Progress Note    Thom Ramirez  8/31/2018 - 9/1/2018    Pre-op Diagnosis:   1. Right  ankle medial malleolar fracture 2. Right ankle high fibular fracture, Maisonnauve injury  3. Syndesmotic disruption right ankle  4. Left ankle lateral malleolus tip avulsion fracture, Sprain left ankle       Post-Op Diagnosis Codes:  1. Right  ankle medial malleolar fracture 2. Right ankle high fibular fracture, Maisonnauve injury  3. Syndesmotic disruption right ankle  4. Left ankle lateral malleolus tip avulsion fracture, Sprain left ankle    Procedure/CPT® Codes:      Procedure(s):  1. ORIF RIGHT ANKLE MEDIAL MALLEOLAR FRACTURE   2. OPEN REDUCTION AND INTERNAL FIXATION OF RIGHT ANKLE SYNDESMOSIS  3. EXAMINATION LEFT ANKLE    Surgeon(s):  Celestina Meng MD    Anesthesia: General    Staff:   Circulator: Kimberly Mcnulty RN  Radiology Technologist: Barbara Faustin RRT  Scrub Person: Niurka Calles  Vendor Representative: Samir Daniels    Estimated Blood Loss: 10 ML    Urine Voided: * No values recorded between 9/1/2018  7:53 AM and 9/1/2018 10:00 AM *    Specimens:                None      Drains:      Findings: SEE DICT     Complications: YOU Meng MD     Date: 9/1/2018  Time: 10:04 AM

## 2018-09-01 NOTE — DISCHARGE SUMMARY
DISCHARGE SUMMARY    Date of Discharge:      Discharge Diagnosis:  Closed fracture of proximal end of right fibula, unspecified fracture morphology, initial encounter [S82.831A]  Displaced fracture of medial malleolus of right tibia, initial encounter for closed fracture [S82.51XA]     1. Right  ankle medial malleolar fracture 2. Right ankle high fibular fracture, Maisonnauve injury  3. Syndesmotic disruption right ankle  4. Left ankle lateral malleolus tip avulsion fracture, Sprain left ankle    DETAILS OF HOSPITAL STAY     Presenting Problem: Closed fracture of proximal end of right fibula, unspecified fracture morphology, initial encounter [S82.831A]  Displaced fracture of medial malleolus of right tibia, initial encounter for closed fracture [S82.51XA]    Hospital Course/History of Present Illness: Patient is a 47 y.o. male presented with Closed fracture of proximal end of right fibula, unspecified fracture morphology, initial encounter [S82.831A]  Displaced fracture of medial malleolus of right tibia, initial encounter for closed fracture [S82.51XA].     The patient fell from a height from a ladder.  He injured both ankles.  He underwent open reduction internal fixation of the RIGHT medial malleolus fracture and syndesmotic fixation.  He was also examined under anesthesia for the LEFT ankle fracture.  The patient did well postoperatively. At the time of discharge, the patient was afebrile, tolerating a regular diet, having normal bowel function, and adequate pain control on p.o. pain medication only. He is able to mobilize weightbearing as tolerated on the LEFT ankle With the [place].      Procedures Performed  Procedure(s):  ORIF RIGHT ANKLE, EXAMINATION LEFT ANKLE       Consults:   Consults     Date and Time Order Name Status Description    8/31/2018 1118 Ortho (on-call MD unless specified) Completed           Pertinent Test Results:     Admission on 08/31/2018   Component Date Value Ref Range Status    • Glucose 09/01/2018 106* 65 - 99 mg/dL Final   • BUN 09/01/2018 11  6 - 20 mg/dL Final   • Creatinine 09/01/2018 1.03  0.76 - 1.27 mg/dL Final   • Sodium 09/01/2018 140  136 - 145 mmol/L Final   • Potassium 09/01/2018 5.0  3.5 - 5.2 mmol/L Final   • Chloride 09/01/2018 104  98 - 107 mmol/L Final   • CO2 09/01/2018 27.3  22.0 - 29.0 mmol/L Final   • Calcium 09/01/2018 8.9  8.6 - 10.5 mg/dL Final   • eGFR Non African Amer 09/01/2018 77  >60 mL/min/1.73 Final   • BUN/Creatinine Ratio 09/01/2018 10.7  7.0 - 25.0 Final   • Anion Gap 09/01/2018 8.7  mmol/L Final   • WBC 09/01/2018 8.53  4.50 - 10.70 10*3/mm3 Final   • RBC 09/01/2018 5.22  4.60 - 6.00 10*6/mm3 Final   • Hemoglobin 09/01/2018 15.0  13.7 - 17.6 g/dL Final   • Hematocrit 09/01/2018 46.8  40.4 - 52.2 % Final   • MCV 09/01/2018 89.7  79.8 - 96.2 fL Final   • MCH 09/01/2018 28.7  27.0 - 32.7 pg Final   • MCHC 09/01/2018 32.1* 32.6 - 36.4 g/dL Final   • RDW 09/01/2018 12.7  11.5 - 14.5 % Final   • RDW-SD 09/01/2018 41.0  37.0 - 54.0 fl Final   • MPV 09/01/2018 10.3  6.0 - 12.0 fL Final   • Platelets 09/01/2018 251  140 - 500 10*3/mm3 Final   • Neutrophil % 09/01/2018 63.6  42.7 - 76.0 % Final   • Lymphocyte % 09/01/2018 25.4  19.6 - 45.3 % Final   • Monocyte % 09/01/2018 8.2  5.0 - 12.0 % Final   • Eosinophil % 09/01/2018 2.2  0.3 - 6.2 % Final   • Basophil % 09/01/2018 0.4  0.0 - 1.5 % Final   • Immature Grans % 09/01/2018 0.2  0.0 - 0.5 % Final   • Neutrophils, Absolute 09/01/2018 5.42  1.90 - 8.10 10*3/mm3 Final   • Lymphocytes, Absolute 09/01/2018 2.17  0.90 - 4.80 10*3/mm3 Final   • Monocytes, Absolute 09/01/2018 0.70  0.20 - 1.20 10*3/mm3 Final   • Eosinophils, Absolute 09/01/2018 0.19  0.00 - 0.70 10*3/mm3 Final   • Basophils, Absolute 09/01/2018 0.03  0.00 - 0.20 10*3/mm3 Final   • Immature Grans, Absolute 09/01/2018 0.02  0.00 - 0.03 10*3/mm3 Final   ; No results found.    Condition on Discharge:  Stable. The patient is awake, alert and oriented.  The  "incision is healthy.  No clinical signs of DVT.      Vital Signs  Temp:  [97 °F (36.1 °C)-98.2 °F (36.8 °C)] 97 °F (36.1 °C)  Heart Rate:  [65-82] 78  Resp:  [12-20] 16  BP: (121-140)/(75-98) 132/88    Flowsheet Rows      First Filed Value   Admission Height  180.3 cm (71\") Documented at 08/31/2018 1117   Admission Weight  91.7 kg (202 lb 3.2 oz) Documented at 08/31/2018 1117          Discharge Disposition  Home or Self Care    Discharge Medications     Discharge Medications      New Medications      Instructions Start Date   HYDROcodone-acetaminophen 7.5-325 MG per tablet  Commonly known as:  NORCO   1 tablet, Oral, Every 4 Hours PRN      naproxen 500 MG tablet  Commonly known as:  NAPROSYN   500 mg, Oral, 2 Times Daily         ASK your doctor about these medications      Instructions Start Date   acetaminophen 500 MG tablet  Commonly known as:  TYLENOL   500 mg, Oral, Every 6 Hours PRN             Discharge Diet: Regular    Activity at Discharge: As tolerated and instructed    Follow-up Appointments  Call [phone] for appointment September 14, 2018.       Celestina Meng MD  09/01/18  5:21 PM    CC: Provider, No Known; Celestina Meng MD   Consults     Date and Time Order Name Status Description    8/31/2018 1118 Ortho (on-call MD unless specified) Completed        Celestina Meng,*                           "

## 2018-09-01 NOTE — ANESTHESIA PROCEDURE NOTES
Airway  Urgency: elective    Airway not difficult    General Information and Staff    Patient location during procedure: OR  Anesthesiologist: NILO CUNNINGHAM  CRNA: DEMETRIO NOONAN    Indications and Patient Condition  Indications for airway management: airway protection    Preoxygenated: yes  Mask difficulty assessment: 0 - not attempted    Final Airway Details  Final airway type: supraglottic airway      Successful airway: classic  Size 5    Number of attempts at approach: 1    Additional Comments  LMA inserted without difficulty.  Lips and teeth intact as preop condition.  No signs or symptoms of gastric regurgitation.  Seal with minimal pressure and secure.

## 2018-09-01 NOTE — THERAPY DISCHARGE NOTE
Acute Care - Physical Therapy Initial Eval/Discharge  [facility]     Patient Name: Thom Ramirez  : 1970  MRN: [id number]  Today's Date: 2018   Onset of Illness/Injury or Date of Surgery: 18  Date of Referral to PT: 18  Referring Physician: Dr. Meng      Admit Date: 2018    Visit Dx:    ICD-10-CM ICD-9-CM   1. Displaced fracture of medial malleolus of right tibia, initial encounter for closed fracture S82.51XA 824.0   2. Closed fracture of proximal end of right fibula, unspecified fracture morphology, initial encounter S82.831A 823.01   3. Difficulty walking R26.2 719.7     Patient Active Problem List   Diagnosis   • Displaced fracture of medial malleolus of right tibia, initial encounter for closed fracture   • Displaced Maisonneuve's fracture of right proximal fibula, closed, initial encounter   • Syndesmotic disruption of right ankle   • Sprain of deltoid ligament of left ankle   • Closed avulsion fracture of lateral malleolus with routine healing, left     Past Medical History:   Diagnosis Date   • Arthritis      Past Surgical History:   Procedure Laterality Date   • EAR TUBES            PT ASSESSMENT (last 12 hours)      Physical Therapy Evaluation     Row Name 18 1513          PT Evaluation Time/Intention    Subjective Information no complaints  -     Document Type evaluation  -     Mode of Treatment individual therapy;physical therapy  -     Patient Effort excellent  -     Symptoms Noted During/After Treatment none  -     Row Name 18 1513          General Information    Patient Profile Reviewed? yes  -     Onset of Illness/Injury or Date of Surgery 18  -     Referring Physician Dr. Meng  -     Patient Observations alert;cooperative;agree to therapy  -     Patient/Family Observations family (son) sitting at bedside  -     General Observations of Patient patient supine in bed  -     Prior Level of Function independent:  -KH      Equipment Currently Used at Home none  -     Pertinent History of Current Functional Problem Fall causing R tibial fracture (NWB) and left avulsions fracture (WBAT in boot)  -     Existing Precautions/Restrictions fall  -     Equipment Ordered for Patient walker, four wheeled  -     Risks Reviewed patient:  -     Benefits Reviewed patient:  -     Barriers to Rehab none identified  -     Row Name 09/01/18 1513          Relationship/Environment    Primary Source of Support/Comfort spouse  -     Lives With spouse  -     Row Name 09/01/18 1513          Resource/Environmental Concerns    Current Living Arrangements home/apartment/condo  -     Resource/Environmental Concerns none  -     Row Name 09/01/18 1513          Cognitive Assessment/Interventions    Additional Documentation Cognitive Assessment/Intervention (Group)  -     Row Name 09/01/18 1513          Cognitive Assessment/Intervention- PT/OT    Orientation Status (Cognition) oriented x 3  -     Follows Commands (Cognition) WNL  -     Personal Safety Interventions fall prevention program maintained;gait belt;nonskid shoes/slippers when out of bed;supervised activity  -     Row Name 09/01/18 1513          Bed Mobility Assessment/Treatment    Bed Mobility Assessment/Treatment bed mobility (all) activities  -     Alliance Level (Bed Mobility) independent  -     Row Name 09/01/18 1513          Transfer Assessment/Treatment    Transfer Assessment/Treatment sit-stand transfer;stand-sit transfer;bed-chair transfer  -     Maintains Weight-bearing Status (Transfers) able to maintain  -     Bed-Chair Alliance (Transfers) conditional independence  -     Assistive Device (Bed-Chair Transfers) walker, front-wheeled  -     Sit-Stand Alliance (Transfers) conditional independence  -     Stand-Sit Alliance (Transfers) conditional independence  -     Row Name 09/01/18 1513          Sit-Stand Transfer    Assistive  Device (Sit-Stand Transfers) walker, front-wheeled  -LILIANE     Row Name 09/01/18 1513          Stand-Sit Transfer    Assistive Device (Stand-Sit Transfers) walker, front-wheeled  -LILIANE     Row Name 09/01/18 1513          Gait/Stairs Assessment/Training    Gait/Stairs Assessment/Training gait/ambulation independence  -KH     Amelia Level (Gait) conditional independence  -KH     Assistive Device (Gait) walker, front-wheeled  -LILIANE     Distance in Feet (Gait) 5  -KH     Bilateral Gait Deviations forward flexed posture  -KH     Maintains Weight-bearing Status (Gait) able to maintain  -KH     Comment (Gait/Stairs) Patient able to maintain NWB on R LE,. recommend patient only ambulate short distanced due to instability on L LE with boot. Patient safe to transfer and walk short distance to bathroom or around house  -LILIANE     Row Name 09/01/18 1513          Pain Assessment    Additional Documentation Pain Scale: FACES Pre/Post-Treatment (Group)  -LILIANE     Row Name 09/01/18 1513          Pain Scale: Numbers Pre/Post-Treatment    Pain Location - Side Right  -     Pain Location ankle  -     Row Name 09/01/18 1513          Pain Scale: FACES Pre/Post-Treatment    Pain: FACES Scale, Pretreatment 2-->hurts little bit  -     Pain: FACES Scale, Post-Treatment 2-->hurts little bit  -     Row Name             [REMOVED] Wound 09/01/18 0925 Right foot incision    Wound - Properties Group Date first assessed: 09/01/18  -RG Time first assessed: 0925  -RG Side: Right  -RG Location: foot  -RG Type: incision  -RG Resolution Date: 09/01/18  -RG Resolution Time: 0926  -RG    Row Name             [REMOVED] Wound 09/01/18 0925 Right foot incision    Wound - Properties Group Date first assessed: 09/01/18  -RG Time first assessed: 0925  -RG Side: Right  -RG Location: foot  -RG Type: incision  -RG Resolution Date: 09/01/18  -RG Resolution Time: 0926  -RG    Row Name             Wound 09/01/18 0932 Right foot incision    Wound - Properties Group  Date first assessed: 09/01/18  -RG Time first assessed: 0932  -RG Side: Right  -RG Location: foot  -RG Type: incision  -RG    Row Name 09/01/18 1513          Plan of Care Review    Plan of Care Reviewed With patient  -LILIANE     Row Name 09/01/18 1513          Physical Therapy Clinical Impression    Date of Referral to PT 08/31/18  -KH     PT Diagnosis (PT Clinical Impression) Difficulty walking  -     Prognosis (PT Clinical Impression) good  -     Functional Level at Time of Evaluation (PT Clinical Impression) Independent  -     Patient/Family Goals Statement (PT Clinical Impression) to go home  -     Criteria for Skilled Interventions Met (PT Clinical Impression) no;no problems identified which require skilled intervention  -     Pathology/Pathophysiology Noted (Describe Specifically for Each System) musculoskeletal  -     Row Name 09/01/18 1513          Positioning and Restraints    Pre-Treatment Position in bed  -     Post Treatment Position bed  -KH     In Bed sitting EOB;call light within reach;encouraged to call for assist  -       User Key  (r) = Recorded By, (t) = Taken By, (c) = Cosigned By    Initials Name Provider Type    Kimberly Lopez, RN Registered Nurse    Debo Montgomery, PT Physical Therapist          Physical Therapy Education     Title: PT OT SLP Therapies (Resolved)     Topic: Physical Therapy (Resolved)     Point: Mobility training (Resolved)    Learning Progress Summary     Learner Status Readiness Method Response Comment Documented by    Patient Done Acceptance HAFSA BECKFORD 09/01/18 1517          Point: Body mechanics (Resolved)    Learning Progress Summary     Learner Status Readiness Method Response Comment Documented by    Patient Done Acceptance HAFSA BECKFORD 09/01/18 1517          Point: Precautions (Resolved)    Learning Progress Summary     Learner Status Readiness Method Response Comment Documented by    Patient Done Acceptance HAFSA BECKFORD 09/01/18 1517                       User Key     Initials Effective Dates Name Provider Type [facility] 06/22/16 -  Debo Escamilla, PT Physical Therapist PT                PT Recommendation and Plan  Anticipated Discharge Disposition (PT): home with assist  Therapy Frequency (PT Clinical Impression): evaluation only  Outcome Summary/Treatment Plan (PT)  Anticipated Discharge Disposition (PT): home with assist  Plan of Care Reviewed With: patient  Progress: improving  Outcome Summary: Patient suffered fall and B ankle fractures, is NWB on R LE and left is WBAT in walking boot. Patient able to ambulate short distances with RW and maintain NWB status and is independent with mobility. D/C PT, no concerns for patient to return home          Outcome Measures     Row Name 09/01/18 1500             How much help from another person do you currently need...    Turning from your back to your side while in flat bed without using bedrails? 4  -KH      Moving from lying on back to sitting on the side of a flat bed without bedrails? 4  -KH      Moving to and from a bed to a chair (including a wheelchair)? 4  -KH      Standing up from a chair using your arms (e.g., wheelchair, bedside chair)? 4  -KH      Climbing 3-5 steps with a railing? 2  -KH      To walk in hospital room? 4  -KH      AM-PAC 6 Clicks Score 22  -KH         Functional Assessment    Outcome Measure Options AM-PAC 6 Clicks Basic Mobility (PT)  -        User Key  (r) = Recorded By, (t) = Taken By, (c) = Cosigned By    Initials Name Provider Type    Debo Montgomery, PT Physical Therapist           Time Calculation:         PT Charges     Row Name 09/01/18 1512 09/01/18 1136          Time Calculation    Start Time 1442  -KH  --     Stop Time 1512  -KH  --     Time Calculation (min) 30 min  -KH  --     PT Received On 09/01/18  -LILIANE  --     PT - Next Appointment  -- 09/02/18  -AR       User Key  (r) = Recorded By, (t) = Taken By, (c) = Cosigned By    Initials Name Provider Type    DEMETRIO Mendez  Lola, PT Physical Therapist    Debo Montgomery, PT Physical Therapist        Therapy Suggested Charges     Code   Minutes Charges    None           Therapy Charges for Today     Code Description Service Date Service Provider Modifiers Qty    11957432888 HC PT EVAL LOW COMPLEXITY 2 9/1/2018 Debo Escamilla, PT GP 1          PT G-Codes  Outcome Measure Options: AM-PAC 6 Clicks Basic Mobility (PT)    PT Discharge Summary  Anticipated Discharge Disposition (PT): home with assist  Reason for Discharge: Independent    Debo Escamilla, PT  9/1/2018

## 2018-09-01 NOTE — ANESTHESIA PREPROCEDURE EVALUATION
Anesthesia Evaluation     Patient summary reviewed and Nursing notes reviewed   NPO Solid Status: > 8 hours  NPO Liquid Status: > 8 hours           Airway   Mallampati: I  TM distance: >3 FB  Neck ROM: full  No difficulty expected  Dental          Pulmonary - normal exam   Cardiovascular - normal exam  Exercise tolerance: excellent (>7 METS)        Neuro/Psych  GI/Hepatic/Renal/Endo      Musculoskeletal     Abdominal    Substance History      OB/GYN          Other   (+) arthritis                   Anesthesia Plan    ASA 1     general     intravenous induction   Anesthetic plan and risks discussed with patient.

## (undated) DEVICE — SKIN PREP TRAY W/CHG: Brand: MEDLINE INDUSTRIES, INC.

## (undated) DEVICE — PAD,ABDOMINAL,8"X10",ST,LF: Brand: MEDLINE

## (undated) DEVICE — THREADED GUIDE WIRE

## (undated) DEVICE — BNDG ESMARK 4IN 12FT LF STRL BLU

## (undated) DEVICE — GLV SURG BIOGEL LTX PF 8

## (undated) DEVICE — TOWEL,OR,DSP,ST,BLUE,STD,4/PK,20PK/CS: Brand: MEDLINE

## (undated) DEVICE — DRILL BIT, AO DIA2.6MM X 135MM, SCALED: Brand: VARIAX

## (undated) DEVICE — DRSNG PAD ABD 8X10IN STRL

## (undated) DEVICE — SOL ISO/ALC RUB 70PCT 4OZ

## (undated) DEVICE — GOWN,PREVENTION PLUS,XLONG/XLARGE,STRL: Brand: MEDLINE

## (undated) DEVICE — T-DRAPE,EXTREMITY,STERILE: Brand: MEDLINE

## (undated) DEVICE — ANTIBACTERIAL UNDYED BRAIDED (POLYGLACTIN 910), SYNTHETIC ABSORBABLE SUTURE: Brand: COATED VICRYL

## (undated) DEVICE — PETROLATUM DRESSING. FINE MESH GAUZE IMPREGNATED WITH 3% BISMUTH TRIBROMOPHENATE  IN A PETROLATUM BLEND.: Brand: XEROFORM PETROLATUM

## (undated) DEVICE — UNDERCAST PADDING: Brand: DEROYAL

## (undated) DEVICE — PK ORTHO MAJ 40

## (undated) DEVICE — DRAPE,U/ SHT,SPLIT,PLAS,STERIL: Brand: MEDLINE

## (undated) DEVICE — ENCORE® LATEX ORTHO SIZE 8, STERILE LATEX POWDER-FREE SURGICAL GLOVE: Brand: ENCORE

## (undated) DEVICE — CANNULATED DRILL

## (undated) DEVICE — SUT ETHLN 3/0 PSL BLK MONO SA 30IN 1691H

## (undated) DEVICE — BNDG ELAS ELITE V/CLOSE 4IN 5YD LF STRL

## (undated) DEVICE — OCCLUSIVE GAUZE STRIP,3% BISMUTH TRIBROMOPHENATE IN PETROLATUM BLEND: Brand: XEROFORM

## (undated) DEVICE — BNDG ELAS ELITE V/CLOSE 6IN 5YD LF STRL

## (undated) DEVICE — DRP C/ARMOR

## (undated) DEVICE — DISPOSABLE TOURNIQUET CUFF SINGLE BLADDER, SINGLE PORT AND QUICK CONNECT CONNECTOR: Brand: COLOR CUFF

## (undated) DEVICE — SUT VIC 0 CT1 36IN J946H

## (undated) DEVICE — APPL CHLORAPREP W/TINT 26ML ORNG

## (undated) DEVICE — SPNG GZ WOVN 4X4IN 12PLY 10/BX STRL

## (undated) DEVICE — SUT VIC 2/0 CT2 27IN J269H

## (undated) DEVICE — GLV SURG TRIUMPH CLASSIC PF LTX 8 STRL

## (undated) DEVICE — DRAPE,REIN 53X77,STERILE: Brand: MEDLINE

## (undated) DEVICE — 3M™ IOBAN™ 2 ANTIMICROBIAL INCISE DRAPE 6640EZ: Brand: IOBAN™ 2

## (undated) DEVICE — PAD GRND REM POLYHESIVE A/ DISP